# Patient Record
Sex: FEMALE | Race: WHITE | Employment: UNEMPLOYED | URBAN - METROPOLITAN AREA
[De-identification: names, ages, dates, MRNs, and addresses within clinical notes are randomized per-mention and may not be internally consistent; named-entity substitution may affect disease eponyms.]

---

## 2017-01-17 ENCOUNTER — APPOINTMENT (OUTPATIENT)
Dept: GENERAL RADIOLOGY | Age: 37
End: 2017-01-17
Attending: FAMILY MEDICINE
Payer: MEDICAID

## 2017-01-17 ENCOUNTER — HOSPITAL ENCOUNTER (OUTPATIENT)
Age: 37
Discharge: HOME | End: 2017-01-17
Attending: FAMILY MEDICINE
Payer: MEDICAID

## 2017-01-17 VITALS
TEMPERATURE: 98 F | WEIGHT: 110 LBS | SYSTOLIC BLOOD PRESSURE: 128 MMHG | HEART RATE: 92 BPM | OXYGEN SATURATION: 100 % | RESPIRATION RATE: 18 BRPM | DIASTOLIC BLOOD PRESSURE: 94 MMHG | BODY MASS INDEX: 21 KG/M2

## 2017-01-17 DIAGNOSIS — S93.601A RIGHT FOOT SPRAIN, INITIAL ENCOUNTER: Primary | ICD-10-CM

## 2017-01-17 PROCEDURE — 99213 OFFICE O/P EST LOW 20 MIN: CPT

## 2017-01-17 PROCEDURE — 99203 OFFICE O/P NEW LOW 30 MIN: CPT

## 2017-01-17 PROCEDURE — 73630 X-RAY EXAM OF FOOT: CPT

## 2017-01-17 NOTE — ED PROVIDER NOTES
Patient presents with: Foot Injury    HPI:   Amaris Gonzalez is a 43year old female present with complain of right  foot pain. Patient was texting while walking down the stairs. She apparently missed the last day and  Cause - injury.   Onset - 1 wee noted above      EXAM:   /94 mmHg  Pulse 92  Temp(Src) 98.3 °F (36.8 °C) (Temporal)  Resp 18  Wt 49.896 kg  SpO2 100%  GENERAL: well developed, well nourished,in no apparent distress  SKIN: no rashes,no suspicious lesions  HEENT: atraumatic, normocep Once  Order Comments: To affected area    Labs performed this visit:  No results found for this or any previous visit (from the past 10 hour(s)). Diagnosis:    ICD-10-CM    1.  Right foot sprain, initial encounter S93.601A XR FOOT, COMPLETE (MIN 3 VIE recheck and possible repeat X-rays to rule out any occult fracture  Patient/parent verbalizes understanding of  plan

## 2017-01-17 NOTE — ED INITIAL ASSESSMENT (HPI)
Pt c/o right foot injury after texting while walking dawit the stairs a week ago, missed the last step and ended up on tip toe and \"toes locked\". Pt not reports she now has pain in the great toe and right foot since when ambulating or using the toe.  Pt pre

## (undated) NOTE — ED AVS SNAPSHOT
[facility] [address] [place] [address]    Phone:  [phone]    Fax:  [phone]           Lord Young   MRN: [id number]    Department:  THE [facility]   Date of Visi If you have any problems with your follow-up, please call our  at [phone]. Si usted tiene algun problema con salamanca sequimiento, por favor llame a nuestro adminstrador de casos al [phone].     Expect to receive an electronic reques Tamera Matthews [address] [facility] ([address]) [address] [facility] [address] ([address]) [address]  ([address]) [address] [facility] PROCEDURE:  XR FOOT, COMPLETE (MIN 3 VIEWS), RIGHT (CPT=73630)     TECHNIQUE:  AP, oblique, and lateral views were obtained. COMPARISON:  None.      INDICATIONS:  foot injury     PATIENT STATED HISTORY:   Pt c/o right foot injury after texting while wa